# Patient Record
Sex: FEMALE | Race: WHITE | NOT HISPANIC OR LATINO | URBAN - METROPOLITAN AREA
[De-identification: names, ages, dates, MRNs, and addresses within clinical notes are randomized per-mention and may not be internally consistent; named-entity substitution may affect disease eponyms.]

---

## 2019-08-22 ENCOUNTER — INPATIENT (INPATIENT)
Facility: HOSPITAL | Age: 19
LOS: 1 days | Discharge: HOME | End: 2019-08-24
Attending: SPECIALIST | Admitting: SPECIALIST

## 2019-08-22 VITALS
DIASTOLIC BLOOD PRESSURE: 70 MMHG | HEART RATE: 85 BPM | SYSTOLIC BLOOD PRESSURE: 103 MMHG | RESPIRATION RATE: 14 BRPM | TEMPERATURE: 98 F

## 2019-08-22 DIAGNOSIS — O26.899 OTHER SPECIFIED PREGNANCY RELATED CONDITIONS, UNSPECIFIED TRIMESTER: ICD-10-CM

## 2019-08-22 DIAGNOSIS — Z3A.00 WEEKS OF GESTATION OF PREGNANCY NOT SPECIFIED: ICD-10-CM

## 2019-08-22 LAB
BASOPHILS # BLD AUTO: 0.02 K/UL — SIGNIFICANT CHANGE UP (ref 0–0.2)
BASOPHILS NFR BLD AUTO: 0.4 % — SIGNIFICANT CHANGE UP (ref 0–2)
BLD GP AB SCN SERPL QL: NEGATIVE — SIGNIFICANT CHANGE UP
EOSINOPHIL # BLD AUTO: 0.01 K/UL — SIGNIFICANT CHANGE UP (ref 0–0.5)
EOSINOPHIL NFR BLD AUTO: 0.2 % — SIGNIFICANT CHANGE UP (ref 0–6)
HCT VFR BLD CALC: 30.1 % — LOW (ref 34.5–45)
HGB BLD-MCNC: 9.5 G/DL — LOW (ref 11.5–15.5)
IMM GRANULOCYTES NFR BLD AUTO: 1 % — SIGNIFICANT CHANGE UP (ref 0–1.5)
LYMPHOCYTES # BLD AUTO: 1.41 K/UL — SIGNIFICANT CHANGE UP (ref 1–3.3)
LYMPHOCYTES # BLD AUTO: 28.3 % — SIGNIFICANT CHANGE UP (ref 13–44)
MCHC RBC-ENTMCNC: 25.2 PG — LOW (ref 27–34)
MCHC RBC-ENTMCNC: 31.6 % — LOW (ref 32–36)
MCV RBC AUTO: 79.8 FL — LOW (ref 80–100)
MONOCYTES # BLD AUTO: 0.26 K/UL — SIGNIFICANT CHANGE UP (ref 0–0.9)
MONOCYTES NFR BLD AUTO: 5.2 % — SIGNIFICANT CHANGE UP (ref 2–14)
NEUTROPHILS # BLD AUTO: 3.24 K/UL — SIGNIFICANT CHANGE UP (ref 1.8–7.4)
NEUTROPHILS NFR BLD AUTO: 64.9 % — SIGNIFICANT CHANGE UP (ref 43–77)
NRBC # FLD: 0 K/UL — SIGNIFICANT CHANGE UP (ref 0–0)
PLATELET # BLD AUTO: 139 K/UL — LOW (ref 150–400)
PMV BLD: 11.7 FL — SIGNIFICANT CHANGE UP (ref 7–13)
RBC # BLD: 3.77 M/UL — LOW (ref 3.8–5.2)
RBC # FLD: 14.9 % — HIGH (ref 10.3–14.5)
RH IG SCN BLD-IMP: POSITIVE — SIGNIFICANT CHANGE UP
RH IG SCN BLD-IMP: POSITIVE — SIGNIFICANT CHANGE UP
WBC # BLD: 4.99 K/UL — SIGNIFICANT CHANGE UP (ref 3.8–10.5)
WBC # FLD AUTO: 4.99 K/UL — SIGNIFICANT CHANGE UP (ref 3.8–10.5)

## 2019-08-22 RX ORDER — SODIUM CHLORIDE 9 MG/ML
1000 INJECTION, SOLUTION INTRAVENOUS
Refills: 0 | Status: DISCONTINUED | OUTPATIENT
Start: 2019-08-22 | End: 2019-08-23

## 2019-08-22 RX ORDER — AMPICILLIN TRIHYDRATE 250 MG
1 CAPSULE ORAL EVERY 4 HOURS
Refills: 0 | Status: DISCONTINUED | OUTPATIENT
Start: 2019-08-22 | End: 2019-08-23

## 2019-08-22 RX ORDER — AMPICILLIN TRIHYDRATE 250 MG
1 CAPSULE ORAL EVERY 4 HOURS
Refills: 0 | Status: DISCONTINUED | OUTPATIENT
Start: 2019-08-22 | End: 2019-08-22

## 2019-08-22 RX ORDER — SODIUM CHLORIDE 9 MG/ML
1000 INJECTION, SOLUTION INTRAVENOUS
Refills: 0 | Status: DISCONTINUED | OUTPATIENT
Start: 2019-08-22 | End: 2019-08-22

## 2019-08-22 RX ORDER — OXYTOCIN 10 UNIT/ML
333.33 VIAL (ML) INJECTION
Qty: 20 | Refills: 0 | Status: DISCONTINUED | OUTPATIENT
Start: 2019-08-22 | End: 2019-08-23

## 2019-08-22 RX ORDER — AMPICILLIN TRIHYDRATE 250 MG
2 CAPSULE ORAL ONCE
Refills: 0 | Status: COMPLETED | OUTPATIENT
Start: 2019-08-22 | End: 2019-08-22

## 2019-08-22 RX ORDER — OXYTOCIN 10 UNIT/ML
333.33 VIAL (ML) INJECTION
Qty: 20 | Refills: 0 | Status: DISCONTINUED | OUTPATIENT
Start: 2019-08-22 | End: 2019-08-22

## 2019-08-22 RX ADMIN — Medication 216 GRAM(S): at 20:29

## 2019-08-22 RX ADMIN — SODIUM CHLORIDE 125 MILLILITER(S): 9 INJECTION, SOLUTION INTRAVENOUS at 23:56

## 2019-08-22 NOTE — OB PROVIDER H&P - HISTORY OF PRESENT ILLNESS
19 y.o.  MALIKA 19 @ 40.4 weeks presents from scheduled prenatal visit for prolonged monitoring 2/2 "Cat II FHR" in office. Pt denies LOF, VB, ctx. States +FM and uncomplicated antepartum course.

## 2019-08-22 NOTE — OB PROVIDER TRIAGE NOTE - HISTORY OF PRESENT ILLNESS
19 y.o.  MALIKA 19 y.o.  MALIKA 19 @ 40.4 weeks presents from scheduled prenatal visit for prolonged monitoring 2/2 "Cat II FHR" in office. Pt denies LOF, VB, ctx. States +FM and uncomplicated antepartum course.

## 2019-08-22 NOTE — OB PROVIDER IHI INDUCTION/AUGMENTATION NOTE - NS_CHECKALL_OBGYN_ALL_OB
H&P was completed/Contractions pattern was reviewed/FHR was reviewed/Order was written/Induction / Augmentation was discussed

## 2019-08-22 NOTE — OB PROVIDER TRIAGE NOTE - NSOBPROVIDERNOTE_OBGYN_ALL_OB_FT
19 y.o.  MALIKA 19 @ 40.4 weeks presents from scheduled prenatal visit for prolonged monitoring 2/2 "Cat II FHR" in office. Pt denies LOF, VB, ctx. States +FM and uncomplicated antepartum course.     allergies:  NKDA  medications:  prenatal vitamins    med/surg/OBGYN hx:  denies    abdomen soft, nontender  taus: sliup, cephalic presentation, posterior placenta, bpp 8/8, gris 9.4, efw 2700 grams  nst in progress 19 y.o.  MALIKA 19 @ 40.4 weeks presents from scheduled prenatal visit for prolonged monitoring 2/2 "Cat II FHR" in office. Pt denies LOF, VB, ctx. States +FM and uncomplicated antepartum course.     allergies:  NKDA  medications:  prenatal vitamins    med/surg/OBGYN hx:  denies    abdomen soft, nontender  taus: sliup, cephalic presentation, posterior placenta, bpp 8/8, gris 9.4, efw 2700 grams  nst in progress    1700    nst cat II, baseline 135 bpm, non recurrent late decels, +accels  tocO; ctx q 5-10 mins    discussed with patient plan for IOL. Pt refusing admission requests to speak with Dr Samuels and family prior to making decision to stay for IOL.    0  Pt desires to speak with attending. Dr Gardiner called and made aware.      Dr Gardiner reviewing NST. Speaking with patient at bedside. Pt desires to wait for  to make final decision regarding IOL.      Dr Gardiner speaking with patient and  at bedside. Pt agree to IOL with PO cytotec. see h+p    Dylan, NP

## 2019-08-22 NOTE — OB PROVIDER TRIAGE NOTE - NSHPPHYSICALEXAM_GEN_ALL_CORE
abdomen soft, nontender  taus: sliup, cephalic presentation, posterior placenta, bpp 8/8, gris 9.4, efw 2700 grams  nst in progress

## 2019-08-22 NOTE — OB PROVIDER H&P - ASSESSMENT
19 y.o.  MALIKA 19 @ 40.4 weeks presents from scheduled prenatal visit for prolonged monitoring 2/2 "Cat II FHR" in office. Pt denies LOF, VB, ctx. States +FM and uncomplicated antepartum course.     allergies:  NKDA  medications:  prenatal vitamins    med/surg/OBGYN hx:  denies    abdomen soft, nontender  taus: sliup, cephalic presentation, posterior placenta, bpp 8/8, gris 9.4, efw 2700 grams  nst in progress    1700    nst cat II, baseline 135 bpm, non recurrent late decels, +accels  tocO; ctx q 5-10 mins    discussed with patient plan for IOL. Pt refusing admission requests to speak with Dr Samuels and family prior to making decision to stay for IOL.      Pt desires to speak with attending. Dr Gardiner called and made aware.      Dr Gardiner reviewing NST. Speaking with patient at bedside. Pt desires to wait for  to make final decision regarding IOL.      Dr Gardiner speaking with patient and  at bedside. Pt agree to IOL with PO cytotec.     a/p: 19 y.o.  @ 40.4 weeks Cat II FHR for IOL    GBS positive for ampicillin prophylaxis    IOL with PO cytotec    Discussed with Dr Angelica Richardson, NP

## 2019-08-23 LAB — T PALLIDUM AB TITR SER: NEGATIVE — SIGNIFICANT CHANGE UP

## 2019-08-23 RX ORDER — OXYCODONE HYDROCHLORIDE 5 MG/1
5 TABLET ORAL
Refills: 0 | Status: DISCONTINUED | OUTPATIENT
Start: 2019-08-23 | End: 2019-08-24

## 2019-08-23 RX ORDER — MAGNESIUM HYDROXIDE 400 MG/1
30 TABLET, CHEWABLE ORAL
Refills: 0 | Status: DISCONTINUED | OUTPATIENT
Start: 2019-08-23 | End: 2019-08-24

## 2019-08-23 RX ORDER — IBUPROFEN 200 MG
600 TABLET ORAL EVERY 6 HOURS
Refills: 0 | Status: COMPLETED | OUTPATIENT
Start: 2019-08-23 | End: 2020-07-21

## 2019-08-23 RX ORDER — OXYTOCIN 10 UNIT/ML
333.33 VIAL (ML) INJECTION
Qty: 20 | Refills: 0 | Status: DISCONTINUED | OUTPATIENT
Start: 2019-08-23 | End: 2019-08-24

## 2019-08-23 RX ORDER — SIMETHICONE 80 MG/1
80 TABLET, CHEWABLE ORAL EVERY 4 HOURS
Refills: 0 | Status: DISCONTINUED | OUTPATIENT
Start: 2019-08-23 | End: 2019-08-24

## 2019-08-23 RX ORDER — AER TRAVELER 0.5 G/1
1 SOLUTION RECTAL; TOPICAL EVERY 4 HOURS
Refills: 0 | Status: DISCONTINUED | OUTPATIENT
Start: 2019-08-23 | End: 2019-08-24

## 2019-08-23 RX ORDER — AMPICILLIN TRIHYDRATE 250 MG
1 CAPSULE ORAL EVERY 4 HOURS
Refills: 0 | Status: DISCONTINUED | OUTPATIENT
Start: 2019-08-23 | End: 2019-08-23

## 2019-08-23 RX ORDER — IBUPROFEN 200 MG
1 TABLET ORAL
Qty: 0 | Refills: 0 | DISCHARGE
Start: 2019-08-23

## 2019-08-23 RX ORDER — SODIUM CHLORIDE 9 MG/ML
3 INJECTION INTRAMUSCULAR; INTRAVENOUS; SUBCUTANEOUS EVERY 8 HOURS
Refills: 0 | Status: DISCONTINUED | OUTPATIENT
Start: 2019-08-23 | End: 2019-08-24

## 2019-08-23 RX ORDER — LANOLIN
1 OINTMENT (GRAM) TOPICAL EVERY 6 HOURS
Refills: 0 | Status: DISCONTINUED | OUTPATIENT
Start: 2019-08-23 | End: 2019-08-24

## 2019-08-23 RX ORDER — OXYCODONE HYDROCHLORIDE 5 MG/1
5 TABLET ORAL ONCE
Refills: 0 | Status: DISCONTINUED | OUTPATIENT
Start: 2019-08-23 | End: 2019-08-24

## 2019-08-23 RX ORDER — PRAMOXINE HYDROCHLORIDE 150 MG/15G
1 AEROSOL, FOAM RECTAL EVERY 4 HOURS
Refills: 0 | Status: DISCONTINUED | OUTPATIENT
Start: 2019-08-23 | End: 2019-08-24

## 2019-08-23 RX ORDER — GLYCERIN ADULT
1 SUPPOSITORY, RECTAL RECTAL AT BEDTIME
Refills: 0 | Status: DISCONTINUED | OUTPATIENT
Start: 2019-08-23 | End: 2019-08-24

## 2019-08-23 RX ORDER — DIBUCAINE 1 %
1 OINTMENT (GRAM) RECTAL
Qty: 0 | Refills: 0 | DISCHARGE
Start: 2019-08-23

## 2019-08-23 RX ORDER — BENZOCAINE 10 %
1 GEL (GRAM) MUCOUS MEMBRANE EVERY 6 HOURS
Refills: 0 | Status: DISCONTINUED | OUTPATIENT
Start: 2019-08-23 | End: 2019-08-24

## 2019-08-23 RX ORDER — ACETAMINOPHEN 500 MG
975 TABLET ORAL
Refills: 0 | Status: DISCONTINUED | OUTPATIENT
Start: 2019-08-23 | End: 2019-08-23

## 2019-08-23 RX ORDER — DIPHENHYDRAMINE HCL 50 MG
25 CAPSULE ORAL EVERY 6 HOURS
Refills: 0 | Status: DISCONTINUED | OUTPATIENT
Start: 2019-08-23 | End: 2019-08-24

## 2019-08-23 RX ORDER — TETANUS TOXOID, REDUCED DIPHTHERIA TOXOID AND ACELLULAR PERTUSSIS VACCINE, ADSORBED 5; 2.5; 8; 8; 2.5 [IU]/.5ML; [IU]/.5ML; UG/.5ML; UG/.5ML; UG/.5ML
0.5 SUSPENSION INTRAMUSCULAR ONCE
Refills: 0 | Status: DISCONTINUED | OUTPATIENT
Start: 2019-08-23 | End: 2019-08-24

## 2019-08-23 RX ORDER — HYDROCORTISONE 1 %
1 OINTMENT (GRAM) TOPICAL EVERY 6 HOURS
Refills: 0 | Status: DISCONTINUED | OUTPATIENT
Start: 2019-08-23 | End: 2019-08-24

## 2019-08-23 RX ORDER — DIBUCAINE 1 %
1 OINTMENT (GRAM) RECTAL EVERY 6 HOURS
Refills: 0 | Status: DISCONTINUED | OUTPATIENT
Start: 2019-08-23 | End: 2019-08-24

## 2019-08-23 RX ORDER — KETOROLAC TROMETHAMINE 30 MG/ML
30 SYRINGE (ML) INJECTION ONCE
Refills: 0 | Status: DISCONTINUED | OUTPATIENT
Start: 2019-08-23 | End: 2019-08-23

## 2019-08-23 RX ORDER — DOCUSATE SODIUM 100 MG
100 CAPSULE ORAL
Refills: 0 | Status: DISCONTINUED | OUTPATIENT
Start: 2019-08-23 | End: 2019-08-24

## 2019-08-23 RX ORDER — IBUPROFEN 200 MG
600 TABLET ORAL EVERY 6 HOURS
Refills: 0 | Status: DISCONTINUED | OUTPATIENT
Start: 2019-08-23 | End: 2019-08-24

## 2019-08-23 RX ORDER — IBUPROFEN 200 MG
600 TABLET ORAL EVERY 6 HOURS
Refills: 0 | Status: DISCONTINUED | OUTPATIENT
Start: 2019-08-23 | End: 2019-08-23

## 2019-08-23 RX ORDER — ACETAMINOPHEN 500 MG
3 TABLET ORAL
Qty: 0 | Refills: 0 | DISCHARGE
Start: 2019-08-23

## 2019-08-23 RX ORDER — ACETAMINOPHEN 500 MG
975 TABLET ORAL EVERY 6 HOURS
Refills: 0 | Status: DISCONTINUED | OUTPATIENT
Start: 2019-08-23 | End: 2019-08-24

## 2019-08-23 RX ADMIN — Medication 975 MILLIGRAM(S): at 14:30

## 2019-08-23 RX ADMIN — Medication 975 MILLIGRAM(S): at 13:55

## 2019-08-23 RX ADMIN — Medication 108 GRAM(S): at 04:37

## 2019-08-23 RX ADMIN — Medication 1000 MILLIUNIT(S)/MIN: at 08:46

## 2019-08-23 RX ADMIN — Medication 108 GRAM(S): at 00:28

## 2019-08-23 NOTE — DISCHARGE NOTE OB - MEDICATION SUMMARY - MEDICATIONS TO TAKE
I will START or STAY ON the medications listed below when I get home from the hospital:    acetaminophen 325 mg oral tablet  -- 3 tab(s) by mouth   -- Indication: For Weeks of gestation of pregnancy not specified    ibuprofen 600 mg oral tablet  -- 1 tab(s) by mouth every 6 hours  -- Indication: For Weeks of gestation of pregnancy not specified    dibucaine 1% topical ointment  -- 1 application on skin every 6 hours, As needed, Perineal discomfort  -- Indication: For Weeks of gestation of pregnancy not specified    Prenatal Multivitamins with Folic Acid 1 mg oral tablet  -- 1 tab(s) by mouth once a day  -- Indication: For Weeks of gestation of pregnancy not specified

## 2019-08-23 NOTE — CHART NOTE - NSCHARTNOTEFT_GEN_A_CORE
S:  Pt seen and examined for cervical change     O:   T(C): 36.8 (04:36)  HR: 60 (04:37)  BP: 102/56 (04:37)  RR: 16 (21:04)  SpO2: --  SVE: 2-3/70/-2  EFM: baseline 130 mod leon +accel no decel  [place]: irreg                      A/P: 19y P0 @ 40+5  -c/w IOL due to Cat II tracing seen in OB office  - tracing has been reassuring overnight  - c/w po cytotec  - pt does not desire pain meds at this time        TLal PGY3  dw Dr Gardiner

## 2019-08-23 NOTE — OB PROVIDER DELIVERY SUMMARY - NSPROVIDERDELIVERYNOTE_OBGYN_ALL_OB_FT
of a viable female infant from OA presentation over a RML episiotomy. Head delivered, nuchal cord x 1 noted. Shoulders & body delivered easily. Infant handed to mother. Cord clamped x 2 & cut. Placenta delivered intact via gentle uterine massage. RML episiotomy repaired in the usual fashion. Excellent hemostasis noted. Fundus firm on bimanual exam.       Attending Dr Worthington  Assistant ELSIE Live

## 2019-08-23 NOTE — DISCHARGE NOTE OB - CARE PROVIDER_API CALL
Eren Samuels)  Obstetrics and Gynecology  [address] [place] [address], [address] [place] [address]  Phone: [phone]  Fax: [phone]  Follow Up Time:

## 2019-08-23 NOTE — DISCHARGE NOTE OB - PATIENT PORTAL LINK FT
You can access the [facility] Patient Portal, offered by [facility], by registering with the following website: [email or web address]

## 2019-08-23 NOTE — PROVIDER CONTACT NOTE (OTHER) - ASSESSMENT
patient laying down at 0952 pulse 59 /82   sitting down at 0955 pulse 68 /57  standing up ay 0957 pulse 94 BP 91/56  patient asymptomatic; denies feeling dizzy, light headed. bleeding moderate with fundus firm and at umbilicus

## 2019-08-23 NOTE — CHART NOTE - NSCHARTNOTEFT_GEN_A_CORE
PA Note    Patient seen & examined for variable decelerations. Uncomfortable    VS  T(C): 36.8 (08-23-19 @ 04:36)  HR: 60 (08-23-19 @ 04:37)  BP: 102/56 (08-23-19 @ 04:37)  RR: 16 (08-22-19 @ 21:04)  SpO2: --    135/mod leon/+accels/intermittent variable decels, early decels   [place] q 2-3min  3.5/80/-2    cont efm/toco  Dr Worthington at bedside reviewing tracing   patient approved for epidural  dw Dr Worthington  [facility] pa

## 2019-08-23 NOTE — OB RN DELIVERY SUMMARY - NS_SEPSISRSKCALC_OBGYN_ALL_OB_FT
EOS calculated successfully. EOS Risk Factor: 0.5/1000 live births ([facility] national incidence); GA=40w5d; Temp=98.6; ROM=1.767; GBS='Unknown'; Antibiotics='GBS specific antibiotics > 2 hrs prior to birth'

## 2019-08-23 NOTE — DISCHARGE NOTE OB - MATERIALS PROVIDED
[facility] [place] Screening Program/Vaccinations/Breastfeeding Mother’s Support Group Information/Guide to Postpartum Care/  Immunization Record/Breastfeeding Log/Shaken Baby Prevention Handout/[facility] Hearing Screen Program/Birth Certificate Instructions

## 2019-08-24 VITALS
DIASTOLIC BLOOD PRESSURE: 67 MMHG | RESPIRATION RATE: 17 BRPM | OXYGEN SATURATION: 99 % | SYSTOLIC BLOOD PRESSURE: 103 MMHG | HEART RATE: 85 BPM | TEMPERATURE: 98 F

## 2019-08-24 NOTE — PROGRESS NOTE ADULT - SUBJECTIVE AND OBJECTIVE BOX
S: Patient doing well. Minimal lochia. Pain controlled. breastfeeding    O: Vital Signs Last 24 Hrs  T(C): 36.4 (24 Aug 2019 05:51), Max: 37.4 (23 Aug 2019 12:30)  T(F): 97.5 (24 Aug 2019 05:51), Max: 99.3 (23 Aug 2019 12:30)  HR: 74 (24 Aug 2019 05:51) (54 - 96)  BP: 103/57 (24 Aug 2019 05:51) (90/58 - 127/81)  BP(mean): --  RR: 18 (24 Aug 2019 05:51) (17 - 18)  SpO2: 99% (24 Aug 2019 05:51) (90% - 100%)    Gen: NAD  Abd: soft, NT, ND, fundus firm below umbilicus  Lochia: moderate  Ext: no tenderness    Labs:                        9.5    4.99  )-----------( 139      ( 22 Aug 2019 19:50 )             30.1       ABO Interpretation: AB ( @ 19:54)    Rh Interpretation: Positive ( @ 19:54)    Antibody Screen Negative      A: 19y PPD# s/p  doing well.     Plan: Continue routine postpartum care.

## 2024-06-06 NOTE — OB PROVIDER TRIAGE NOTE - NS_DISPOSITION_OBGYN_ALL_OB
Onset:    Location/Description: Scratched left eye with finger - eye is really red and puffy, watering and notices a yellow mucous discharge. Vision is a little blurry.   Cramping/Contractions:  Not assessed  Vaginal Drainage/Bleeding:  Not assessed  Fetus Active?  Not assessed  Symptom :  Not applicable - see protocol section  Precipitating Factors:  Nothing noted  Pain Scale (1-10), 10 highest: Denies eye pain   LMP: Patient's last menstrual period was 2023.  EDC:  2024  Gestational Age:  36w2d  Blood Type: A Rh Negative  OB History:    OB History    Para Term  AB Living   4 1 1 0 2 1   SAB IAB Ectopic Molar Multiple Live Births   1 0 0 0 0 1        Vaginal/C Section:  0/1   Group B Strep (pos or neg):  Not available  History of previous Labor & Delivery:  Yes  Recent visits (last 3-4 weeks) for same reason or recent surgery: Has not been evaluated for current symptoms     PLAN:  Go to the Emergency Department    Patient/Caller agrees to follow recommendations.        Reason for Disposition   Vision is blurred or lost in either eye    Protocols used: Eye Injury-A-     Continue to Observe Admit to Labor and Delivery